# Patient Record
Sex: MALE | Race: OTHER | NOT HISPANIC OR LATINO | URBAN - METROPOLITAN AREA
[De-identification: names, ages, dates, MRNs, and addresses within clinical notes are randomized per-mention and may not be internally consistent; named-entity substitution may affect disease eponyms.]

---

## 2020-10-01 VITALS
HEIGHT: 68 IN | SYSTOLIC BLOOD PRESSURE: 123 MMHG | RESPIRATION RATE: 18 BRPM | WEIGHT: 176.59 LBS | DIASTOLIC BLOOD PRESSURE: 55 MMHG | HEART RATE: 60 BPM | TEMPERATURE: 98 F | OXYGEN SATURATION: 99 %

## 2020-10-01 RX ORDER — ROSUVASTATIN CALCIUM 5 MG/1
1 TABLET ORAL
Qty: 0 | Refills: 0 | DISCHARGE

## 2020-10-02 ENCOUNTER — INPATIENT (INPATIENT)
Facility: HOSPITAL | Age: 70
LOS: 1 days | Discharge: HOME | DRG: 331 | End: 2020-10-04
Attending: COLON & RECTAL SURGERY | Admitting: COLON & RECTAL SURGERY
Payer: MEDICARE

## 2020-10-02 DIAGNOSIS — Z98.890 OTHER SPECIFIED POSTPROCEDURAL STATES: Chronic | ICD-10-CM

## 2020-10-02 LAB
ANION GAP SERPL CALC-SCNC: 10 MMOL/L — SIGNIFICANT CHANGE UP (ref 5–17)
ANION GAP SERPL CALC-SCNC: 13 MMOL/L — SIGNIFICANT CHANGE UP (ref 5–17)
BLD GP AB SCN SERPL QL: NEGATIVE — SIGNIFICANT CHANGE UP
BUN SERPL-MCNC: 17 MG/DL — SIGNIFICANT CHANGE UP (ref 7–23)
BUN SERPL-MCNC: 18 MG/DL — SIGNIFICANT CHANGE UP (ref 7–23)
CALCIUM SERPL-MCNC: 8.8 MG/DL — SIGNIFICANT CHANGE UP (ref 8.4–10.5)
CALCIUM SERPL-MCNC: 9.3 MG/DL — SIGNIFICANT CHANGE UP (ref 8.4–10.5)
CHLORIDE SERPL-SCNC: 108 MMOL/L — SIGNIFICANT CHANGE UP (ref 96–108)
CHLORIDE SERPL-SCNC: 110 MMOL/L — HIGH (ref 96–108)
CO2 SERPL-SCNC: 22 MMOL/L — SIGNIFICANT CHANGE UP (ref 22–31)
CO2 SERPL-SCNC: 24 MMOL/L — SIGNIFICANT CHANGE UP (ref 22–31)
CREAT SERPL-MCNC: 1.12 MG/DL — SIGNIFICANT CHANGE UP (ref 0.5–1.3)
CREAT SERPL-MCNC: 1.14 MG/DL — SIGNIFICANT CHANGE UP (ref 0.5–1.3)
GLUCOSE BLDC GLUCOMTR-MCNC: 112 MG/DL — HIGH (ref 70–99)
GLUCOSE SERPL-MCNC: 112 MG/DL — HIGH (ref 70–99)
GLUCOSE SERPL-MCNC: 158 MG/DL — HIGH (ref 70–99)
HCT VFR BLD CALC: 38.6 % — LOW (ref 39–50)
HGB BLD-MCNC: 13.1 G/DL — SIGNIFICANT CHANGE UP (ref 13–17)
MAGNESIUM SERPL-MCNC: 1.7 MG/DL — SIGNIFICANT CHANGE UP (ref 1.6–2.6)
MCHC RBC-ENTMCNC: 33.2 PG — SIGNIFICANT CHANGE UP (ref 27–34)
MCHC RBC-ENTMCNC: 33.9 GM/DL — SIGNIFICANT CHANGE UP (ref 32–36)
MCV RBC AUTO: 98 FL — SIGNIFICANT CHANGE UP (ref 80–100)
NRBC # BLD: 0 /100 WBCS — SIGNIFICANT CHANGE UP (ref 0–0)
PHOSPHATE SERPL-MCNC: 4 MG/DL — SIGNIFICANT CHANGE UP (ref 2.5–4.5)
PLATELET # BLD AUTO: 143 K/UL — LOW (ref 150–400)
POTASSIUM SERPL-MCNC: 4.3 MMOL/L — SIGNIFICANT CHANGE UP (ref 3.5–5.3)
POTASSIUM SERPL-MCNC: SIGNIFICANT CHANGE UP MMOL/L (ref 3.5–5.3)
POTASSIUM SERPL-SCNC: 4.3 MMOL/L — SIGNIFICANT CHANGE UP (ref 3.5–5.3)
POTASSIUM SERPL-SCNC: SIGNIFICANT CHANGE UP MMOL/L (ref 3.5–5.3)
RBC # BLD: 3.94 M/UL — LOW (ref 4.2–5.8)
RBC # FLD: 12 % — SIGNIFICANT CHANGE UP (ref 10.3–14.5)
RH IG SCN BLD-IMP: POSITIVE — SIGNIFICANT CHANGE UP
SARS-COV-2 IGG SERPL QL IA: NEGATIVE — SIGNIFICANT CHANGE UP
SARS-COV-2 IGM SERPL IA-ACNC: <0.1 INDEX — SIGNIFICANT CHANGE UP
SODIUM SERPL-SCNC: 143 MMOL/L — SIGNIFICANT CHANGE UP (ref 135–145)
SODIUM SERPL-SCNC: 144 MMOL/L — SIGNIFICANT CHANGE UP (ref 135–145)
WBC # BLD: 8.42 K/UL — SIGNIFICANT CHANGE UP (ref 3.8–10.5)
WBC # FLD AUTO: 8.42 K/UL — SIGNIFICANT CHANGE UP (ref 3.8–10.5)

## 2020-10-02 PROCEDURE — 88304 TISSUE EXAM BY PATHOLOGIST: CPT | Mod: 26

## 2020-10-02 PROCEDURE — 88307 TISSUE EXAM BY PATHOLOGIST: CPT | Mod: 26

## 2020-10-02 PROCEDURE — 88305 TISSUE EXAM BY PATHOLOGIST: CPT | Mod: 26

## 2020-10-02 RX ORDER — MAGNESIUM SULFATE 500 MG/ML
1 VIAL (ML) INJECTION ONCE
Refills: 0 | Status: COMPLETED | OUTPATIENT
Start: 2020-10-02 | End: 2020-10-02

## 2020-10-02 RX ORDER — SODIUM CHLORIDE 9 MG/ML
1000 INJECTION, SOLUTION INTRAVENOUS
Refills: 0 | Status: DISCONTINUED | OUTPATIENT
Start: 2020-10-02 | End: 2020-10-04

## 2020-10-02 RX ORDER — TAMSULOSIN HYDROCHLORIDE 0.4 MG/1
0.4 CAPSULE ORAL AT BEDTIME
Refills: 0 | Status: DISCONTINUED | OUTPATIENT
Start: 2020-10-02 | End: 2020-10-04

## 2020-10-02 RX ORDER — HEPARIN SODIUM 5000 [USP'U]/ML
5000 INJECTION INTRAVENOUS; SUBCUTANEOUS EVERY 8 HOURS
Refills: 0 | Status: DISCONTINUED | OUTPATIENT
Start: 2020-10-02 | End: 2020-10-04

## 2020-10-02 RX ORDER — BUPIVACAINE 13.3 MG/ML
20 INJECTION, SUSPENSION, LIPOSOMAL INFILTRATION ONCE
Refills: 0 | Status: DISCONTINUED | OUTPATIENT
Start: 2020-10-02 | End: 2020-10-04

## 2020-10-02 RX ORDER — MAGNESIUM SULFATE 500 MG/ML
1 VIAL (ML) INJECTION ONCE
Refills: 0 | Status: DISCONTINUED | OUTPATIENT
Start: 2020-10-02 | End: 2020-10-02

## 2020-10-02 RX ORDER — ACETAMINOPHEN 500 MG
650 TABLET ORAL EVERY 4 HOURS
Refills: 0 | Status: DISCONTINUED | OUTPATIENT
Start: 2020-10-02 | End: 2020-10-04

## 2020-10-02 RX ORDER — CHOLECALCIFEROL (VITAMIN D3) 125 MCG
0 CAPSULE ORAL
Qty: 0 | Refills: 0 | DISCHARGE

## 2020-10-02 RX ORDER — HEPARIN SODIUM 5000 [USP'U]/ML
5000 INJECTION INTRAVENOUS; SUBCUTANEOUS ONCE
Refills: 0 | Status: COMPLETED | OUTPATIENT
Start: 2020-10-02 | End: 2020-10-02

## 2020-10-02 RX ORDER — OXYCODONE HYDROCHLORIDE 5 MG/1
5 TABLET ORAL EVERY 4 HOURS
Refills: 0 | Status: DISCONTINUED | OUTPATIENT
Start: 2020-10-02 | End: 2020-10-04

## 2020-10-02 RX ORDER — OXYCODONE HYDROCHLORIDE 5 MG/1
2.5 TABLET ORAL EVERY 4 HOURS
Refills: 0 | Status: DISCONTINUED | OUTPATIENT
Start: 2020-10-02 | End: 2020-10-04

## 2020-10-02 RX ORDER — FINASTERIDE 5 MG/1
5 TABLET, FILM COATED ORAL DAILY
Refills: 0 | Status: DISCONTINUED | OUTPATIENT
Start: 2020-10-02 | End: 2020-10-04

## 2020-10-02 RX ORDER — KETOROLAC TROMETHAMINE 30 MG/ML
15 SYRINGE (ML) INJECTION EVERY 6 HOURS
Refills: 0 | Status: DISCONTINUED | OUTPATIENT
Start: 2020-10-02 | End: 2020-10-04

## 2020-10-02 RX ORDER — ASPIRIN/CALCIUM CARB/MAGNESIUM 324 MG
1 TABLET ORAL
Qty: 0 | Refills: 0 | DISCHARGE

## 2020-10-02 RX ORDER — ACETAMINOPHEN 500 MG
1000 TABLET ORAL ONCE
Refills: 0 | Status: COMPLETED | OUTPATIENT
Start: 2020-10-02 | End: 2020-10-02

## 2020-10-02 RX ORDER — ONDANSETRON 8 MG/1
4 TABLET, FILM COATED ORAL EVERY 6 HOURS
Refills: 0 | Status: DISCONTINUED | OUTPATIENT
Start: 2020-10-02 | End: 2020-10-04

## 2020-10-02 RX ORDER — FINASTERIDE 5 MG/1
1 TABLET, FILM COATED ORAL
Qty: 0 | Refills: 0 | DISCHARGE

## 2020-10-02 RX ORDER — GABAPENTIN 400 MG/1
600 CAPSULE ORAL ONCE
Refills: 0 | Status: COMPLETED | OUTPATIENT
Start: 2020-10-02 | End: 2020-10-02

## 2020-10-02 RX ORDER — BICTEGRAVIR SODIUM, EMTRICITABINE, AND TENOFOVIR ALAFENAMIDE FUMARATE 30; 120; 15 MG/1; MG/1; MG/1
1 TABLET ORAL DAILY
Refills: 0 | Status: DISCONTINUED | OUTPATIENT
Start: 2020-10-02 | End: 2020-10-04

## 2020-10-02 RX ADMIN — Medication 650 MILLIGRAM(S): at 18:53

## 2020-10-02 RX ADMIN — Medication 1000 MILLIGRAM(S): at 07:39

## 2020-10-02 RX ADMIN — Medication 650 MILLIGRAM(S): at 22:07

## 2020-10-02 RX ADMIN — Medication 15 MILLIGRAM(S): at 23:10

## 2020-10-02 RX ADMIN — Medication 650 MILLIGRAM(S): at 16:12

## 2020-10-02 RX ADMIN — GABAPENTIN 600 MILLIGRAM(S): 400 CAPSULE ORAL at 07:39

## 2020-10-02 RX ADMIN — Medication 650 MILLIGRAM(S): at 21:37

## 2020-10-02 RX ADMIN — Medication 650 MILLIGRAM(S): at 18:51

## 2020-10-02 RX ADMIN — Medication 15 MILLIGRAM(S): at 23:30

## 2020-10-02 RX ADMIN — Medication 100 GRAM(S): at 14:11

## 2020-10-02 RX ADMIN — HEPARIN SODIUM 5000 UNIT(S): 5000 INJECTION INTRAVENOUS; SUBCUTANEOUS at 19:02

## 2020-10-02 RX ADMIN — Medication 15 MILLIGRAM(S): at 18:51

## 2020-10-02 RX ADMIN — HEPARIN SODIUM 5000 UNIT(S): 5000 INJECTION INTRAVENOUS; SUBCUTANEOUS at 07:39

## 2020-10-02 RX ADMIN — BICTEGRAVIR SODIUM, EMTRICITABINE, AND TENOFOVIR ALAFENAMIDE FUMARATE 1 TABLET(S): 30; 120; 15 TABLET ORAL at 21:37

## 2020-10-02 RX ADMIN — Medication 15 MILLIGRAM(S): at 18:53

## 2020-10-02 RX ADMIN — Medication 650 MILLIGRAM(S): at 14:11

## 2020-10-02 NOTE — H&P ADULT - NSICDXPASTMEDICALHX_GEN_ALL_CORE_FT
PAST MEDICAL HISTORY:  BPH (benign prostatic hyperplasia)     Diverticulitis     Dyslipidemia     HIV (human immunodeficiency virus infection)

## 2020-10-02 NOTE — H&P ADULT - HISTORY OF PRESENT ILLNESS
70M with a hx of HIV, HLD, BPH and complicated diverticulitis managed last year with percutaneous drainage and IV abx at an OSH. Most recent cscope prior to that episode and reportedly WNL. 70M with a hx of HIV (CD4 723, VL <20 on July 2020), HLD, BPH and complicated diverticulitis managed last year with percutaneous drainage and IV abx at an OSH. Most recent cscope prior to that episode and reportedly WNL. Denies any dysuria/fecaluria/pneumaturia. Took mechanical and abx bowel prep preop without difficulty. No recent fevers, chills, nausea, vomiting, CP, SOB.

## 2020-10-02 NOTE — PROGRESS NOTE ADULT - SUBJECTIVE AND OBJECTIVE BOX
POST-OPERATIVE NOTE    Procedure: Laparoscopic sigmoidectomy with appendectomy    Diagnosis/Indication: Diverticulitis    Surgeon: Dr. Davidson    S: Resting comfortably in bed. Reporting some diffuse abdominal soreness, received tylenol. Has not attempted to drink anything since procedure. Otherwise has no complaints. No flatus, BMs, or ambulation since procedure. Otherwise denies f/c, n/v, CP, SOB, weakness or pain in lower extremities.      O:  T(C): 35.8 (10-02-20 @ 11:58), Max: 35.8 (10-02-20 @ 11:58)  T(F): 96.4 (10-02-20 @ 11:58), Max: 96.4 (10-02-20 @ 11:58)  HR: 58 (10-02-20 @ 14:23) (54 - 63)  BP: 142/64 (10-02-20 @ 14:23) (119/55 - 142/64)  RR: 23 (10-02-20 @ 14:23) (14 - 26)  SpO2: 100% (10-02-20 @ 14:23) (100% - 100%)  Wt(kg): --                        13.1   8.42  )-----------( 143      ( 02 Oct 2020 12:40 )             38.6     10-02    144  |  110<H>  |  18  ----------------------------<  158<H>  4.3   |  24  |  1.14    Ca    8.8      02 Oct 2020 12:40  Phos  4.0     10-02  Mg     1.7     10-02        Gen: NAD, resting comfortably in bed  C/V: NSR  Pulm: Nonlabored breathing, no respiratory distress, on NC and NRB per colorectal bundle  Abd: soft, minimally distended, appropriately TTP. No rebound or guarding.   Extrem: WWP, no calf edema or tenderness, SCDs in place  Incisions: c/d/i    ASSESSMENT:  70M with a hx of HIV (CD4 723, VL <20 on July 2020), HLD, BPH and complicated diverticulitis managed last year with percutaneous drainage and IV abx at an OSH presenting for elective sigmoidectomy now s/p laparoscopic sigmoidectomy and appendectomy (10/2)    Plan  CLD/IVF  F/u postop labs  Analgesics PRN IV and PO  Antiemetics PRN  Continue with home meds  IS/OOB  VTE PPX with SCDs and SQH

## 2020-10-02 NOTE — BRIEF OPERATIVE NOTE - OPERATION/FINDINGS
Diagnostic laparoscopy revealing woody and inflamed sigmoid colon adherent to left pelvic side wall with appendix and several loops of small bowel stuck to chronically inflamed colon. Subsequent lysis of adhesions and mobilization of appendix using ligasure cautery and appendectomy using 45mm Purple EndoGIA stapler. Laparoscopic splenic flexure, descending colon and sigmoid colon mobilization. Distal margin transected at top of rectum using Purple EndoGIA. Via a 5cm infraumbilical incision, the sigmoid colon was extracorporealized and proximal margin transected at healthy descending colon. Subsequent creation of primary colorectal anastomosis using a 29mm transanal circular EEA stapler. Two intact donuts sent to pathology. Anastomosis inspected using bulb syringe and verified no air leak. Hemostasis achieved at the end of the procedure and with the colorectal bundle closure tray fascia primarily closed with looped PDS. Skin closed with monocryl.

## 2020-10-02 NOTE — H&P ADULT - ASSESSMENT
70M with hx of HIV, BPH, and complicated diverticulitis now here for an elective laparoscopic sigmoidectomy.    - Will admit to surgical service post operatively, Team 1, Dr. Davidson, [facility] bed.  - ERAS/Colorectal Bundle.

## 2020-10-02 NOTE — BRIEF OPERATIVE NOTE - NSICDXBRIEFPROCEDURE_GEN_ALL_CORE_FT
PROCEDURES:  Lap appendectomy 02-Oct-2020 12:05:03  Magdaleno Velasquez  Resection, colon, left or sigmoid, laparoscopic 02-Oct-2020 12:04:56  Magdaleno Velasquez

## 2020-10-03 LAB
ANION GAP SERPL CALC-SCNC: 9 MMOL/L — SIGNIFICANT CHANGE UP (ref 5–17)
BLD GP AB SCN SERPL QL: NEGATIVE — SIGNIFICANT CHANGE UP
BUN SERPL-MCNC: 21 MG/DL — SIGNIFICANT CHANGE UP (ref 7–23)
CALCIUM SERPL-MCNC: 8.2 MG/DL — LOW (ref 8.4–10.5)
CHLORIDE SERPL-SCNC: 106 MMOL/L — SIGNIFICANT CHANGE UP (ref 96–108)
CO2 SERPL-SCNC: 24 MMOL/L — SIGNIFICANT CHANGE UP (ref 22–31)
CREAT SERPL-MCNC: 1.15 MG/DL — SIGNIFICANT CHANGE UP (ref 0.5–1.3)
GLUCOSE SERPL-MCNC: 100 MG/DL — HIGH (ref 70–99)
HCT VFR BLD CALC: 35.6 % — LOW (ref 39–50)
HCV AB S/CO SERPL IA: 0.1 S/CO — SIGNIFICANT CHANGE UP
HCV AB SERPL-IMP: SIGNIFICANT CHANGE UP
HGB BLD-MCNC: 11.5 G/DL — LOW (ref 13–17)
MAGNESIUM SERPL-MCNC: 2 MG/DL — SIGNIFICANT CHANGE UP (ref 1.6–2.6)
MCHC RBC-ENTMCNC: 32.3 GM/DL — SIGNIFICANT CHANGE UP (ref 32–36)
MCHC RBC-ENTMCNC: 32.3 PG — SIGNIFICANT CHANGE UP (ref 27–34)
MCV RBC AUTO: 100 FL — SIGNIFICANT CHANGE UP (ref 80–100)
NRBC # BLD: 0 /100 WBCS — SIGNIFICANT CHANGE UP (ref 0–0)
PHOSPHATE SERPL-MCNC: 3.9 MG/DL — SIGNIFICANT CHANGE UP (ref 2.5–4.5)
PLATELET # BLD AUTO: 146 K/UL — LOW (ref 150–400)
POTASSIUM SERPL-MCNC: 4.7 MMOL/L — SIGNIFICANT CHANGE UP (ref 3.5–5.3)
POTASSIUM SERPL-SCNC: 4.7 MMOL/L — SIGNIFICANT CHANGE UP (ref 3.5–5.3)
RBC # BLD: 3.56 M/UL — LOW (ref 4.2–5.8)
RBC # FLD: 11.9 % — SIGNIFICANT CHANGE UP (ref 10.3–14.5)
RH IG SCN BLD-IMP: POSITIVE — SIGNIFICANT CHANGE UP
SODIUM SERPL-SCNC: 139 MMOL/L — SIGNIFICANT CHANGE UP (ref 135–145)
WBC # BLD: 8.55 K/UL — SIGNIFICANT CHANGE UP (ref 3.8–10.5)
WBC # FLD AUTO: 8.55 K/UL — SIGNIFICANT CHANGE UP (ref 3.8–10.5)

## 2020-10-03 RX ADMIN — BICTEGRAVIR SODIUM, EMTRICITABINE, AND TENOFOVIR ALAFENAMIDE FUMARATE 1 TABLET(S): 30; 120; 15 TABLET ORAL at 21:44

## 2020-10-03 RX ADMIN — Medication 650 MILLIGRAM(S): at 08:54

## 2020-10-03 RX ADMIN — TAMSULOSIN HYDROCHLORIDE 0.4 MILLIGRAM(S): 0.4 CAPSULE ORAL at 05:16

## 2020-10-03 RX ADMIN — FINASTERIDE 5 MILLIGRAM(S): 5 TABLET, FILM COATED ORAL at 12:28

## 2020-10-03 RX ADMIN — Medication 15 MILLIGRAM(S): at 19:05

## 2020-10-03 RX ADMIN — Medication 650 MILLIGRAM(S): at 12:27

## 2020-10-03 RX ADMIN — Medication 650 MILLIGRAM(S): at 09:24

## 2020-10-03 RX ADMIN — Medication 15 MILLIGRAM(S): at 05:30

## 2020-10-03 RX ADMIN — Medication 650 MILLIGRAM(S): at 19:05

## 2020-10-03 RX ADMIN — Medication 650 MILLIGRAM(S): at 22:23

## 2020-10-03 RX ADMIN — Medication 15 MILLIGRAM(S): at 05:16

## 2020-10-03 RX ADMIN — HEPARIN SODIUM 5000 UNIT(S): 5000 INJECTION INTRAVENOUS; SUBCUTANEOUS at 04:51

## 2020-10-03 RX ADMIN — Medication 15 MILLIGRAM(S): at 12:27

## 2020-10-03 RX ADMIN — HEPARIN SODIUM 5000 UNIT(S): 5000 INJECTION INTRAVENOUS; SUBCUTANEOUS at 12:27

## 2020-10-03 RX ADMIN — Medication 15 MILLIGRAM(S): at 12:57

## 2020-10-03 RX ADMIN — Medication 650 MILLIGRAM(S): at 18:35

## 2020-10-03 RX ADMIN — HEPARIN SODIUM 5000 UNIT(S): 5000 INJECTION INTRAVENOUS; SUBCUTANEOUS at 21:19

## 2020-10-03 RX ADMIN — Medication 650 MILLIGRAM(S): at 12:57

## 2020-10-03 RX ADMIN — Medication 15 MILLIGRAM(S): at 18:35

## 2020-10-03 RX ADMIN — Medication 650 MILLIGRAM(S): at 04:51

## 2020-10-03 RX ADMIN — Medication 650 MILLIGRAM(S): at 05:30

## 2020-10-03 RX ADMIN — Medication 650 MILLIGRAM(S): at 23:23

## 2020-10-03 NOTE — PROGRESS NOTE ADULT - SUBJECTIVE AND OBJECTIVE BOX
Patient is having issues urinating and feels the pressure and pain that he can not go.    Attending Surgeon Progress Note    STATUS POST:  laparoscopic sigmoidectomy and appendectomy  POST OPERATIVE DAY #: 1    SUBJECTIVE:  feels good, no significant pain, has been ambulating  Hungry: N  Flatus: Y  Bowel movement: N  Voiding spontaneously: N  Pain controlled: YES   Nausea: NO            Vomiting: NO  Diarrhea: NO         Chest Pain: NO    SOB: NO  OOB/Ambulating: Y    MEDICATIONS  (STANDING):  acetaminophen   Tablet .. 650 milliGRAM(s) Oral every 4 hours  bictegravir 50 mG/emtricitabine 200 mG/tenofovir alafenamide 25 mG (BIKTARVY) 1 Tablet(s) Oral daily  BUpivacaine liposome 1.3% Injectable (no eMAR) 20 milliLiter(s) Local Injection once  finasteride 5 milliGRAM(s) Oral daily  heparin   Injectable 5000 Unit(s) SubCutaneous every 8 hours  ketorolac   Injectable 15 milliGRAM(s) IV Push every 6 hours  lactated ringers. 1000 milliLiter(s) (50 mL/Hr) IV Continuous <Continuous>  tamsulosin 0.4 milliGRAM(s) Oral at bedtime    MEDICATIONS  (PRN):  ondansetron Injectable 4 milliGRAM(s) IV Push every 6 hours PRN Nausea and/or Vomiting  oxyCODONE    IR 5 milliGRAM(s) Oral every 4 hours PRN Severe Pain (7 - 10)  oxyCODONE    IR 2.5 milliGRAM(s) Oral every 4 hours PRN Moderate Pain (4 - 6)      Vital Signs Last 24 Hrs  T(C): 37 (03 Oct 2020 05:04), Max: 37 (03 Oct 2020 05:04)  T(F): 98.6 (03 Oct 2020 05:04), Max: 98.6 (03 Oct 2020 05:04)  HR: 50 (03 Oct 2020 05:04) (50 - 63)  BP: 120/71 (03 Oct 2020 05:04) (119/55 - 146/60)  BP(mean): 92 (02 Oct 2020 14:23) (82 - 92)  RR: 17 (03 Oct 2020 05:04) (14 - 26)  SpO2: 98% (03 Oct 2020 05:04) (97% - 100%)    I&O's Detail    02 Oct 2020 07:01  -  03 Oct 2020 07:00  --------------------------------------------------------  IN:    Lactated Ringers: 350 mL    Oral Fluid: 360 mL  Total IN: 710 mL    OUT:    Indwelling Catheter - Urethral (mL): 1100 mL  Total OUT: 1100 mL    Total NET: -390 mL          PHYSICAL EXAM:  Alert, oriented  Lungs:  CTA bilaterally, good inspiratory effort  Cor:  RRR  Abdomen:  soft, nondistended, nontender/incisional tenderness, +bowel sounds, incision/dressing clean dry, intact with no erythema  Ext:  no edema, well-perfused      LABS:                        11.5   8.55  )-----------( 146      ( 03 Oct 2020 06:24 )             35.6     10-03    139  |  106  |  21  ----------------------------<  100<H>  4.7   |  24  |  1.15    Ca    8.2<L>      03 Oct 2020 06:24  Phos  3.9     10-03  Mg     2.0     10-03            RADIOLOGY:  PATHOLOGY:

## 2020-10-03 NOTE — PROGRESS NOTE ADULT - SUBJECTIVE AND OBJECTIVE BOX
POST-OP DAY: 1 s/p lap sigmoidectomy + appendectomy     SUBJECTIVE: Patient seen and examined bedside by chief resident. Zeb CLD, + flatus, - BM. Pain well managed.     bictegravir 50 mG/emtricitabine 200 mG/tenofovir alafenamide 25 mG (BIKTARVY) 1 Tablet(s) Oral daily  heparin   Injectable 5000 Unit(s) SubCutaneous every 8 hours  tamsulosin 0.4 milliGRAM(s) Oral at bedtime    MEDICATIONS  (PRN):  ondansetron Injectable 4 milliGRAM(s) IV Push every 6 hours PRN Nausea and/or Vomiting  oxyCODONE    IR 5 milliGRAM(s) Oral every 4 hours PRN Severe Pain (7 - 10)  oxyCODONE    IR 2.5 milliGRAM(s) Oral every 4 hours PRN Moderate Pain (4 - 6)      I&O's Detail    02 Oct 2020 07:01  -  03 Oct 2020 07:00  --------------------------------------------------------  IN:    Lactated Ringers: 350 mL    Oral Fluid: 360 mL  Total IN: 710 mL    OUT:    Indwelling Catheter - Urethral (mL): 1100 mL  Total OUT: 1100 mL    Total NET: -390 mL          Vital Signs Last 24 Hrs  T(C): 37 (03 Oct 2020 05:04), Max: 37 (03 Oct 2020 05:04)  T(F): 98.6 (03 Oct 2020 05:04), Max: 98.6 (03 Oct 2020 05:04)  HR: 50 (03 Oct 2020 05:04) (50 - 63)  BP: 120/71 (03 Oct 2020 05:04) (119/55 - 146/60)  BP(mean): 92 (02 Oct 2020 14:23) (82 - 92)  RR: 17 (03 Oct 2020 05:04) (14 - 26)  SpO2: 98% (03 Oct 2020 05:04) (97% - 100%)    General: NAD, resting comfortably in bed  C/V: NSR  Pulm: Nonlabored breathing, no respiratory distress  Abd: soft, ND. Incisions - clean, dry, glued. +PIT.   Extrem: WWP, no edema, SCDs in place    LABS:                        11.5   8.55  )-----------( 146      ( 03 Oct 2020 06:24 )             35.6     10-03    139  |  106  |  21  ----------------------------<  100<H>  4.7   |  24  |  1.15    Ca    8.2<L>      03 Oct 2020 06:24  Phos  3.9     10-03  Mg     2.0     10-03            RADIOLOGY & ADDITIONAL STUDIES:

## 2020-10-04 VITALS
OXYGEN SATURATION: 98 % | HEART RATE: 48 BPM | SYSTOLIC BLOOD PRESSURE: 115 MMHG | DIASTOLIC BLOOD PRESSURE: 66 MMHG | TEMPERATURE: 98 F | RESPIRATION RATE: 18 BRPM

## 2020-10-04 LAB
ANION GAP SERPL CALC-SCNC: 8 MMOL/L — SIGNIFICANT CHANGE UP (ref 5–17)
BUN SERPL-MCNC: 18 MG/DL — SIGNIFICANT CHANGE UP (ref 7–23)
CALCIUM SERPL-MCNC: 8.4 MG/DL — SIGNIFICANT CHANGE UP (ref 8.4–10.5)
CHLORIDE SERPL-SCNC: 105 MMOL/L — SIGNIFICANT CHANGE UP (ref 96–108)
CO2 SERPL-SCNC: 25 MMOL/L — SIGNIFICANT CHANGE UP (ref 22–31)
CREAT SERPL-MCNC: 1.2 MG/DL — SIGNIFICANT CHANGE UP (ref 0.5–1.3)
GLUCOSE SERPL-MCNC: 102 MG/DL — HIGH (ref 70–99)
HCT VFR BLD CALC: 34.4 % — LOW (ref 39–50)
HGB BLD-MCNC: 11.6 G/DL — LOW (ref 13–17)
MAGNESIUM SERPL-MCNC: 1.9 MG/DL — SIGNIFICANT CHANGE UP (ref 1.6–2.6)
MCHC RBC-ENTMCNC: 33.5 PG — SIGNIFICANT CHANGE UP (ref 27–34)
MCHC RBC-ENTMCNC: 33.7 GM/DL — SIGNIFICANT CHANGE UP (ref 32–36)
MCV RBC AUTO: 99.4 FL — SIGNIFICANT CHANGE UP (ref 80–100)
NRBC # BLD: 0 /100 WBCS — SIGNIFICANT CHANGE UP (ref 0–0)
PHOSPHATE SERPL-MCNC: 2.5 MG/DL — SIGNIFICANT CHANGE UP (ref 2.5–4.5)
PLATELET # BLD AUTO: 117 K/UL — LOW (ref 150–400)
POTASSIUM SERPL-MCNC: 4 MMOL/L — SIGNIFICANT CHANGE UP (ref 3.5–5.3)
POTASSIUM SERPL-SCNC: 4 MMOL/L — SIGNIFICANT CHANGE UP (ref 3.5–5.3)
RBC # BLD: 3.46 M/UL — LOW (ref 4.2–5.8)
RBC # FLD: 12.1 % — SIGNIFICANT CHANGE UP (ref 10.3–14.5)
SODIUM SERPL-SCNC: 138 MMOL/L — SIGNIFICANT CHANGE UP (ref 135–145)
WBC # BLD: 5.9 K/UL — SIGNIFICANT CHANGE UP (ref 3.8–10.5)
WBC # FLD AUTO: 5.9 K/UL — SIGNIFICANT CHANGE UP (ref 3.8–10.5)

## 2020-10-04 RX ORDER — TAMSULOSIN HYDROCHLORIDE 0.4 MG/1
1 CAPSULE ORAL
Qty: 0 | Refills: 0 | DISCHARGE

## 2020-10-04 RX ORDER — ROSUVASTATIN CALCIUM 5 MG/1
1 TABLET ORAL
Qty: 0 | Refills: 0 | DISCHARGE

## 2020-10-04 RX ORDER — TAMSULOSIN HYDROCHLORIDE 0.4 MG/1
1 CAPSULE ORAL
Qty: 0 | Refills: 0 | DISCHARGE
Start: 2020-10-04

## 2020-10-04 RX ORDER — BICTEGRAVIR SODIUM, EMTRICITABINE, AND TENOFOVIR ALAFENAMIDE FUMARATE 30; 120; 15 MG/1; MG/1; MG/1
1 TABLET ORAL
Qty: 0 | Refills: 0 | DISCHARGE
Start: 2020-10-04

## 2020-10-04 RX ORDER — ROSUVASTATIN CALCIUM 5 MG/1
1 TABLET ORAL
Qty: 30 | Refills: 0
Start: 2020-10-04 | End: 2020-11-02

## 2020-10-04 RX ORDER — POTASSIUM PHOSPHATE, MONOBASIC POTASSIUM PHOSPHATE, DIBASIC 236; 224 MG/ML; MG/ML
15 INJECTION, SOLUTION INTRAVENOUS ONCE
Refills: 0 | Status: COMPLETED | OUTPATIENT
Start: 2020-10-04 | End: 2020-10-04

## 2020-10-04 RX ORDER — BICTEGRAVIR SODIUM, EMTRICITABINE, AND TENOFOVIR ALAFENAMIDE FUMARATE 30; 120; 15 MG/1; MG/1; MG/1
1 TABLET ORAL
Qty: 0 | Refills: 0 | DISCHARGE

## 2020-10-04 RX ADMIN — Medication 650 MILLIGRAM(S): at 09:39

## 2020-10-04 RX ADMIN — POTASSIUM PHOSPHATE, MONOBASIC POTASSIUM PHOSPHATE, DIBASIC 63.75 MILLIMOLE(S): 236; 224 INJECTION, SOLUTION INTRAVENOUS at 12:53

## 2020-10-04 RX ADMIN — Medication 15 MILLIGRAM(S): at 13:23

## 2020-10-04 RX ADMIN — TAMSULOSIN HYDROCHLORIDE 0.4 MILLIGRAM(S): 0.4 CAPSULE ORAL at 05:11

## 2020-10-04 RX ADMIN — Medication 15 MILLIGRAM(S): at 00:15

## 2020-10-04 RX ADMIN — Medication 15 MILLIGRAM(S): at 12:53

## 2020-10-04 RX ADMIN — FINASTERIDE 5 MILLIGRAM(S): 5 TABLET, FILM COATED ORAL at 12:52

## 2020-10-04 RX ADMIN — Medication 15 MILLIGRAM(S): at 05:11

## 2020-10-04 RX ADMIN — Medication 650 MILLIGRAM(S): at 06:00

## 2020-10-04 RX ADMIN — Medication 650 MILLIGRAM(S): at 05:11

## 2020-10-04 RX ADMIN — HEPARIN SODIUM 5000 UNIT(S): 5000 INJECTION INTRAVENOUS; SUBCUTANEOUS at 05:11

## 2020-10-04 RX ADMIN — Medication 650 MILLIGRAM(S): at 10:09

## 2020-10-04 RX ADMIN — Medication 15 MILLIGRAM(S): at 06:00

## 2020-10-04 RX ADMIN — Medication 15 MILLIGRAM(S): at 00:04

## 2020-10-04 NOTE — PROGRESS NOTE ADULT - SUBJECTIVE AND OBJECTIVE BOX
SUBJECTIVE: Patient seen and examined bedside by chief resident. Patient reports       bictegravir 50 mG/emtricitabine 200 mG/tenofovir alafenamide 25 mG (BIKTARVY) 1 Tablet(s) Oral daily  heparin   Injectable 5000 Unit(s) SubCutaneous every 8 hours  tamsulosin 0.4 milliGRAM(s) Oral at bedtime      Vital Signs Last 24 Hrs  T(C): 36.5 (04 Oct 2020 05:19), Max: 37 (03 Oct 2020 20:00)  T(F): 97.7 (04 Oct 2020 05:19), Max: 98.6 (03 Oct 2020 20:00)  HR: 52 (04 Oct 2020 05:19) (48 - 54)  BP: 122/69 (04 Oct 2020 05:19) (111/63 - 122/71)  BP(mean): --  RR: 17 (04 Oct 2020 05:19) (17 - 18)  SpO2: 95% (04 Oct 2020 05:19) (95% - 98%)  I&O's Detail    02 Oct 2020 07:01  -  03 Oct 2020 07:00  --------------------------------------------------------  IN:    Lactated Ringers: 350 mL    Oral Fluid: 360 mL  Total IN: 710 mL    OUT:    Indwelling Catheter - Urethral (mL): 1100 mL  Total OUT: 1100 mL    Total NET: -390 mL      03 Oct 2020 07:01  -  04 Oct 2020 06:26  --------------------------------------------------------  IN:    Lactated Ringers: 250 mL  Total IN: 250 mL    OUT:    Voided (mL): 1450 mL  Total OUT: 1450 mL    Total NET: -1200 mL          PHYSICAL EXAMINATION   General: NAD  NEURO:  follows commands.   PULM: nonlabored breathing, no respiratory distress  ABD: soft, nondistended, appropriately tender, no rebound, no guarding, no tympany. Incisions CDI and without hematoma or erythema    EXTREM: WWP, no edema, no calf tenderness  VASC: No cyanosis,  no pallor.   PSYCH: Appropriate affect, answers questions appropriately      LABS:                        11.5   8.55  )-----------( 146      ( 03 Oct 2020 06:24 )             35.6     10-03    139  |  106  |  21  ----------------------------<  100<H>  4.7   |  24  |  1.15    Ca    8.2<L>      03 Oct 2020 06:24  Phos  3.9     10-03  Mg     2.0     10-03            SUBJECTIVE: Patient seen and examined bedside by chief resident. Patient reports feeling well, spoke with Dr. Davidson that wants to advance the diet, BF present, [id number].       bictegravir 50 mG/emtricitabine 200 mG/tenofovir alafenamide 25 mG (BIKTARVY) 1 Tablet(s) Oral daily  heparin   Injectable 5000 Unit(s) SubCutaneous every 8 hours  tamsulosin 0.4 milliGRAM(s) Oral at bedtime      Vital Signs Last 24 Hrs  T(C): 36.5 (04 Oct 2020 05:19), Max: 37 (03 Oct 2020 20:00)  T(F): 97.7 (04 Oct 2020 05:19), Max: 98.6 (03 Oct 2020 20:00)  HR: 52 (04 Oct 2020 05:19) (48 - 54)  BP: 122/69 (04 Oct 2020 05:19) (111/63 - 122/71)  BP(mean): --  RR: 17 (04 Oct 2020 05:19) (17 - 18)  SpO2: 95% (04 Oct 2020 05:19) (95% - 98%)  I&O's Detail    02 Oct 2020 07:01  -  03 Oct 2020 07:00  --------------------------------------------------------  IN:    Lactated Ringers: 350 mL    Oral Fluid: 360 mL  Total IN: 710 mL    OUT:    Indwelling Catheter - Urethral (mL): 1100 mL  Total OUT: 1100 mL    Total NET: -390 mL      03 Oct 2020 07:01  -  04 Oct 2020 06:26  --------------------------------------------------------  IN:    Lactated Ringers: 250 mL  Total IN: 250 mL    OUT:    Voided (mL): 1450 mL  Total OUT: 1450 mL    Total NET: -1200 mL          PHYSICAL EXAMINATION   General: NAD  NEURO:  follows commands.   PULM: nonlabored breathing, no respiratory distress  ABD: soft, nondistended, appropriately tender, no rebound, no guarding, no tympany. Incisions CDI and without hematoma or erythema    EXTREM: WWP, no edema, no calf tenderness  VASC: No cyanosis,  no pallor.   PSYCH: Appropriate affect, answers questions appropriately      LABS:                        11.5   8.55  )-----------( 146      ( 03 Oct 2020 06:24 )             35.6     10-03    139  |  106  |  21  ----------------------------<  100<H>  4.7   |  24  |  1.15    Ca    8.2<L>      03 Oct 2020 06:24  Phos  3.9     10-03  Mg     2.0     10-03

## 2020-10-04 NOTE — DISCHARGE NOTE PROVIDER - HOSPITAL COURSE
70M with a hx of HIV (CD4 723, VL <20 on July 2020), HLD, BPH and complicated diverticulitis managed last year with percutaneous drainage and IV abx at an OSH presenting for elective sigmoidectomy now s/p laparoscopic sigmoidectomy and appendectomy ( appendix was adhered to the pelvic inflammatory process associated with the episodes of diverticulitis) on October 2, 2020. Postoperative course was unremarkable with advancement of diet with good tolerance, quick return of bowel function, HDS. Today the patient has no complains, feeling well, bowel function present, tolerating well the diet and normal PE. Patient will be discharged home today with Percocet and FU appointment in 1 week,

## 2020-10-04 NOTE — PROGRESS NOTE ADULT - SUBJECTIVE AND OBJECTIVE BOX
Attending Surgeon Progress Note  STATUS POST:  laparoscopic sigmoidectomy and appendectomy  POST OPERATIVE DAY #: 2    SUBJECTIVE: feels good, tolerating clears without nausea, ambulating, pain controlled without narcotics  Flatus: Y  Bowel movement: Y  Pain controlled: YES  Voiding:  Voiding spontaneously after humphreys removed yesterday     PHYSICAL EXAM:  Alert, oriented  Lungs:  CTA bilaterally, good inspiratory effort  Cor:  RRR  Abdomen:  soft, nondistended, nontender, +bowel sounds, incisions clean dry, intact with no erythema, stable skin bruising around all port sites  Ext:  no edema, well-perfused

## 2020-10-04 NOTE — DISCHARGE NOTE PROVIDER - INSTRUCTIONS
Follow low fiber diet/low residue   - Vegetables should initially be cooked (backed, steamed, blanched)  - May want to start with peeled and/or canned foods   - Limit fat/oil intake and fried/greasy foods  - Add small amounts of fiber back in to the diet every couple days.

## 2020-10-04 NOTE — DISCHARGE NOTE PROVIDER - CARE PROVIDER_API CALL
Cem Davidson  COLON/RECTAL SURGERY  [address] [place] [address], [address]  [place] [address]  Phone: [phone]  Fax: [phone]  Follow Up Time:

## 2020-10-04 NOTE — DISCHARGE NOTE PROVIDER - NSDCFUADDINST_GEN_ALL_CORE_FT
Follow up with Dr. Davidson in 1 weeks. Please call the office for appointment.     General Discharge Instructions:  Please resume all regular home medications unless specifically advised not to take a particular medication. Also, please take any new medications as prescribed.  Please get plenty of rest, continue to ambulate several times per day, and drink adequate amounts of fluids. Avoid lifting weights greater than 5-10 lbs until you follow-up with your surgeon, who will instruct you further regarding activity restrictions.  Avoid driving or operating heavy machinery while taking pain medications.  Please follow-up with your surgeon and Primary Care Provider (PCP) as advised.  Incision Care:  *Please call your doctor or nurse practitioner if you have increased pain, swelling, redness, or drainage from the incision site.  *Avoid swimming and baths until your follow-up appointment.  *You may shower, and wash surgical incisions with a mild soap and warm water. Gently pat the area dry.    Warning Signs:  Please call your doctor or nurse practitioner if you experience the following:  *You experience new chest pain, pressure, squeezing or tightness.  *New or worsening cough, shortness of breath, or wheeze.  *If you are vomiting and cannot keep down fluids or your medications.  *You are getting dehydrated due to continued vomiting, diarrhea, or other reasons. Signs of dehydration include dry mouth, rapid heartbeat, or feeling dizzy or faint when standing.  *You see blood or dark/black material when you vomit or have a bowel movement.  *You experience burning when you urinate, have blood in your urine, or experience a discharge.  *Your pain is not improving within 8-12 hours or is not gone within 24 hours. Call or return immediately if your pain is getting worse, changes location, or moves to your chest or back.  *You have shaking chills, or fever greater than 101.5 degrees Fahrenheit or 38 degrees Celsius.  *Any change in your symptoms, or any new symptoms that concern you.

## 2020-10-04 NOTE — PROGRESS NOTE ADULT - ASSESSMENT
70M with a hx of HIV (CD4 723, VL <20 on July 2020), HLD, BPH and complicated diverticulitis managed last year with percutaneous drainage and IV abx at an OSH presenting for elective sigmoidectomy now s/p laparoscopic sigmoidectomy and appendectomy (10/2)    CLD/IVF  Pain + Nausea control   Continue with home meds  IS/OOB  VTE PPX with SCDs and SQH  AM labs   TOV 1430  
POD#1 s/p laparoscopic sigmoicectomy and appendectomy for diverticular disease  1.  D/C juliann, TOARIAN  2.  Clear liquids today.  3.  Avoid narcotic pain meds unless absolutely necessary  4.  IS, OOB.
POD#2 s/p sigmoidectomy and appendectomy, doing well, has return of bowel function  1.  Advance to low residue diet  2.  If tolerating diet, may go home this afternoon.  3.  He will follow up with me in the office in about one week.
70M with a hx of HIV (CD4 723, VL <20 on July 2020), HLD, BPH and complicated diverticulitis managed last year with percutaneous drainage and IV abx at an OSH presenting for elective sigmoidectomy now s/p laparoscopic sigmoidectomy and appendectomy (10/2)      Analgesics PRN IV and PO  Antiemetics PRN  Continue with home meds  IS/OOB  VTE PPX with SCDs and SQH  AM labs   Advanced to LRD

## 2020-10-04 NOTE — DISCHARGE NOTE NURSING/CASE MANAGEMENT/SOCIAL WORK - PATIENT PORTAL LINK FT
You can access the FollowMyHealth Patient Portal offered by [facility] by registering at the following website: [email or web address]. By joining [facility]’s FollowMyHealth portal, you will also be able to view your health information using other applications (apps) compatible with our system.

## 2020-10-04 NOTE — DISCHARGE NOTE PROVIDER - NSDCCPCAREPLAN_GEN_ALL_CORE_FT
PRINCIPAL DISCHARGE DIAGNOSIS  Diagnosis: Sigmoid diverticulitis  Assessment and Plan of Treatment: 70M with a hx of HIV (CD4 723, VL <20 on July 2020), HLD, BPH and complicated diverticulitis managed last year with percutaneous drainage and IV abx at an OSH presenting for elective sigmoidectomy now s/p laparoscopic sigmoidectomy and appendectomy on October 2, 2020. Postoperative course was unremarkable with advancement of diet with good tolerance, quick return of bowel function, HDS. Today the patient has no complains, feeling well, bowel function present, tolerating well the diet and normal PE. Patient will be discharged home today with Percocet and FU appointment in 1 week,

## 2020-10-04 NOTE — DISCHARGE NOTE PROVIDER - NSDCMRMEDTOKEN_GEN_ALL_CORE_FT
aspirin 81 mg oral delayed release tablet: 1 tab(s) orally once a day  finasteride 5 mg oral tablet: 1 tab(s) orally once a day  Multiple Vitamins oral tablet: 1 tab(s) orally once a day  oxycodone-acetaminophen 5 mg-300 mg oral tablet: 1 tab(s) orally every 6 hours, As Needed -for severe pain MDD:4 tabs   rosuvastatin 10 mg oral tablet: 1 tab(s) orally once a day MDD:1 tab  Vitamin D3 1000 intl units (25 mcg) oral capsule: orally once a day   aspirin 81 mg oral delayed release tablet: 1 tab(s) orally once a day  finasteride 5 mg oral tablet: 1 tab(s) orally once a day  Multiple Vitamins oral tablet: 1 tab(s) orally once a day  rosuvastatin 10 mg oral tablet: 1 tab(s) orally once a day MDD:1 tab  Vitamin D3 1000 intl units (25 mcg) oral capsule: orally once a day   aspirin 81 mg oral delayed release tablet: 1 tab(s) orally once a day  finasteride 5 mg oral tablet: 1 tab(s) orally once a day  Multiple Vitamins oral tablet: 1 tab(s) orally once a day  oxycodone-acetaminophen 5 mg-300 mg oral tablet: 1 tab(s) orally every 6 hours, As Needed -for severe pain   MDD:4 tabs   rosuvastatin 10 mg oral tablet: 1 tab(s) orally once a day MDD:1 tab  Vitamin D3 1000 intl units (25 mcg) oral capsule: orally once a day   aspirin 81 mg oral delayed release tablet: 1 tab(s) orally once a day  bictegravir/emtricitabine/tenofovir 50 mg-200 mg-25 mg oral tablet: 1 tab(s) orally once a day  finasteride 5 mg oral tablet: 1 tab(s) orally once a day  Multiple Vitamins oral tablet: 1 tab(s) orally once a day  oxycodone-acetaminophen 5 mg-325 mg oral tablet: 1 tab(s) orally every 6 hours MDD:4  rosuvastatin 10 mg oral tablet: 1 tab(s) orally once a day MDD:1 tab  tamsulosin 0.4 mg oral capsule: 1 cap(s) orally once a day (at bedtime)  Vitamin D3 1000 intl units (25 mcg) oral capsule: orally once a day

## 2020-10-09 DIAGNOSIS — K57.32 DIVERTICULITIS OF LARGE INTESTINE WITHOUT PERFORATION OR ABSCESS WITHOUT BLEEDING: ICD-10-CM

## 2020-10-09 DIAGNOSIS — Z21 ASYMPTOMATIC HUMAN IMMUNODEFICIENCY VIRUS [HIV] INFECTION STATUS: ICD-10-CM

## 2020-10-09 DIAGNOSIS — N40.0 BENIGN PROSTATIC HYPERPLASIA WITHOUT LOWER URINARY TRACT SYMPTOMS: ICD-10-CM

## 2020-10-09 DIAGNOSIS — E78.5 HYPERLIPIDEMIA, UNSPECIFIED: ICD-10-CM

## 2020-10-09 DIAGNOSIS — Z88.2 ALLERGY STATUS TO SULFONAMIDES: ICD-10-CM

## 2020-10-09 LAB — SURGICAL PATHOLOGY STUDY: SIGNIFICANT CHANGE UP

## 2020-10-13 PROCEDURE — 82962 GLUCOSE BLOOD TEST: CPT

## 2020-10-13 PROCEDURE — 86803 HEPATITIS C AB TEST: CPT

## 2020-10-13 PROCEDURE — 36415 COLL VENOUS BLD VENIPUNCTURE: CPT

## 2020-10-13 PROCEDURE — 80048 BASIC METABOLIC PNL TOTAL CA: CPT

## 2020-10-13 PROCEDURE — 84100 ASSAY OF PHOSPHORUS: CPT

## 2020-10-13 PROCEDURE — 86769 SARS-COV-2 COVID-19 ANTIBODY: CPT

## 2020-10-13 PROCEDURE — 88307 TISSUE EXAM BY PATHOLOGIST: CPT

## 2020-10-13 PROCEDURE — 86850 RBC ANTIBODY SCREEN: CPT

## 2020-10-13 PROCEDURE — 85027 COMPLETE CBC AUTOMATED: CPT

## 2020-10-13 PROCEDURE — 88304 TISSUE EXAM BY PATHOLOGIST: CPT

## 2020-10-13 PROCEDURE — 86901 BLOOD TYPING SEROLOGIC RH(D): CPT

## 2020-10-13 PROCEDURE — 88305 TISSUE EXAM BY PATHOLOGIST: CPT

## 2020-10-13 PROCEDURE — C1889: CPT

## 2020-10-13 PROCEDURE — 83735 ASSAY OF MAGNESIUM: CPT

## 2020-10-13 PROCEDURE — 86900 BLOOD TYPING SEROLOGIC ABO: CPT

## 2023-10-17 NOTE — PRE-OP CHECKLIST - RESPIRATORY RATE (BREATHS/MIN)
18 Dapsone Counseling: I discussed with the patient the risks of dapsone including but not limited to hemolytic anemia, agranulocytosis, rashes, methemoglobinemia, kidney failure, peripheral neuropathy, headaches, GI upset, and liver toxicity.  Patients who start dapsone require monitoring including baseline LFTs and weekly CBCs for the first month, then every month thereafter.  The patient verbalized understanding of the proper use and possible adverse effects of dapsone.  All of the patient's questions and concerns were addressed.

## 2023-10-23 NOTE — PRE-OP CHECKLIST - AS BP NONINV METHOD
electronic Abbe Flap (Lower To Upper Lip) Text: The defect of the upper lip was assessed and measured.  Given the location and size of the defect, an Abbe flap was deemed most appropriate.  Using a sterile surgical marker, an appropriate Abbe flap was measured and drawn on the lower lip. Local anesthesia was then infiltrated. A scalpel was then used to incise the upper lip through and through the skin, vermilion, muscle and mucosa, leaving the flap pedicled on the opposite side.  The flap was then rotated and transferred to the lower lip defect.  The flap was then sutured into place with a three layer technique, closing the orbicularis oris muscle layer with subcutaneous buried sutures, followed by a mucosal layer and an epidermal layer.

## 2023-12-05 NOTE — PRE-OP CHECKLIST - BSA (M2)
I saw and evaluated the patient. I personally obtained the key and critical portions of the history and physical exam or was physically present for key and critical portions performed by the resident/fellow. I reviewed the resident/fellow's documentation and discussed the patient with the resident/fellow. I agree with the resident/fellow's medical decision making as documented in the note.    Joey Morse MD       1.94